# Patient Record
Sex: FEMALE | Race: WHITE | URBAN - METROPOLITAN AREA
[De-identification: names, ages, dates, MRNs, and addresses within clinical notes are randomized per-mention and may not be internally consistent; named-entity substitution may affect disease eponyms.]

---

## 2022-09-28 ENCOUNTER — OFFICE VISIT (OUTPATIENT)
Dept: NEUROLOGY | Facility: MEDICAL CENTER | Age: 62
End: 2022-09-28
Attending: PSYCHIATRY & NEUROLOGY
Payer: OTHER MISCELLANEOUS

## 2022-09-28 VITALS
SYSTOLIC BLOOD PRESSURE: 129 MMHG | HEART RATE: 77 BPM | OXYGEN SATURATION: 95 % | TEMPERATURE: 97.6 F | RESPIRATION RATE: 16 BRPM | DIASTOLIC BLOOD PRESSURE: 82 MMHG

## 2022-09-28 DIAGNOSIS — R20.2 NUMBNESS AND TINGLING OF BOTH FEET: Primary | ICD-10-CM

## 2022-09-28 DIAGNOSIS — R20.0 NUMBNESS AND TINGLING OF BOTH FEET: Primary | ICD-10-CM

## 2022-09-28 PROBLEM — G57.52 TARSAL TUNNEL SYNDROME OF LEFT SIDE: Status: ACTIVE | Noted: 2019-07-12

## 2022-09-28 PROCEDURE — 99202 OFFICE O/P NEW SF 15 MIN: CPT | Performed by: PSYCHIATRY & NEUROLOGY

## 2022-09-28 PROCEDURE — 99205 OFFICE O/P NEW HI 60 MIN: CPT | Performed by: PSYCHIATRY & NEUROLOGY

## 2022-09-28 RX ORDER — OMEPRAZOLE 20 MG/1
20 TABLET, DELAYED RELEASE ORAL DAILY
COMMUNITY

## 2022-09-28 RX ORDER — GABAPENTIN 300 MG/1
CAPSULE ORAL
Qty: 120 CAPSULE | Refills: 1 | Status: SHIPPED | OUTPATIENT
Start: 2022-09-28

## 2022-09-28 RX ORDER — THYROID,PORK 15 MG
15 TABLET ORAL EVERY MORNING
COMMUNITY
Start: 2022-09-14

## 2022-09-28 RX ORDER — THYROID, PORCINE 120 MG/1
120 TABLET ORAL EVERY MORNING
COMMUNITY
Start: 2022-09-15

## 2022-09-28 ASSESSMENT — ENCOUNTER SYMPTOMS
TINGLING: 1
FOCAL WEAKNESS: 0
NECK PAIN: 0
SENSORY CHANGE: 1
FALLS: 0
MEMORY LOSS: 0
BACK PAIN: 0

## 2022-09-28 ASSESSMENT — PATIENT HEALTH QUESTIONNAIRE - PHQ9: CLINICAL INTERPRETATION OF PHQ2 SCORE: 0

## 2022-09-29 NOTE — PROGRESS NOTES
"Melissa Sellers Galt is a 62 y.o. female who presents with her , from the office of Eusebio Orr MD, for consultation, with a history of bilateral paresthesias in the feet suggestive of possible polyneuropathy, status post EMG/NCV studies from 3 years ago.     PRITESH Spencer is a pleasant 62-year-old right-handed woman who symptoms in her feet started in about 2018, when she noted over the left lower extremity and foot numbness and tingling that involved the sole and heel.  This was most noticeable when she was wearing tighter shoes and hard soles.  Weightbearing made things worse.  Simply sitting down and loosening her shoes would always help.    She describes a \"pain\" like sensation and she now actually has some hyperalgesia in the left foot.  This is noticeable when she goes to sleep and at times feels better with her foot out from underneath the sheet.  She now has to wear soft soled shoes only.  At times she has to walk on her toes to avoid pressing down on the heel.  There is no actual weakness.  Balance was not more curtailed.  The right lower extremity and foot were not similarly involved when things started.  She has no history of back pain, there has been no change in bowel or bladder control at that time.    In July, 2019, she did undergo EMG/NCV studies of the lower extremities, I had a chance to review the report, revealing slowed distal latencies involving both medial and lateral tarsal nerves consistent with tarsal tunnel syndrome.  There was an incidental decreased amplitude of the sensory conduction in the right sural nerve of unclear significance.  There was no indication of a peripheral polyneuropathy or radiculopathy.    Over the last 6 months the right foot has begun to show signs of similar nature and distribution.  These fluctuate, but it is still along the heel and sole of the foot only.  It too worsens with weightbearing.  The left foot symptoms have continued on and " "there typical pattern.    She denies any history of weakness on either side, perianal or perigenital loss of sensation, constrictive sensations around the abdominal wall, and though there has been some recent change in urinary urgency and frequency, she is not incontinent.  There have been no directed treatments.    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  There have been no specific treatments offered otherwise.    She has a history of GERD and thyroid disease, no history of diabetes, malignancy, MS, autoimmune disease, hypertension, CVA, PVD, CAD, neurodegenerative disease, pulmonary disease, or blood dyscrasia.  There is no surgical history of note from my standpoint.    No one in the family has a history of similar symptoms or holds a diagnosis of neuropathy.    There is no history of tobacco or alcohol use of note.  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  She has no history of regular exposure to organic solvents, heavy metals, etc.  She is on [place] Thyroid and Prilosec OTC.    Review of Systems   Cardiovascular:  Negative for leg swelling.   Musculoskeletal:  Negative for back pain, falls and neck pain.   Neurological:  Positive for tingling and sensory change. Negative for focal weakness.   Psychiatric/Behavioral:  Negative for memory loss.    All other systems reviewed and are negative.    Objective     /82 (BP Location: Right arm, Patient Position: Sitting, BP Cuff Size: Adult)   Pulse 77   Temp 36.4 °C (97.6 °F) (Temporal)   Resp 16   Ht (P) 1.575 m (5' 2\")   Wt (P) 116 kg (255 lb 8.2 oz)   SpO2 95%   BMI (P) 46.73 kg/m²      Physical Exam    She appears in no acute distress.  Vital signs are stable.  She is overweight.  There is no malar rash or jaw claudication.  The neck is supple, range of motion is full, Lhermitte's " phenomena is absent, compression maneuvers are negative.  Cardiac evaluation is unremarkable.  Straight leg raising is negative bilaterally.  Distal pulses are intact in the feet.  The feet are warm to touch, capillary refill is normal bilaterally.     Neurological Exam    Fully oriented, there is no aphasia, agnosia, apraxia, or inattention.    PERRLA/EOMI, visual fields are full, facial movements are symmetric.  Sensory exam is intact to light touch, temperature and pinprick bilaterally.  The tongue and uvula are midline, there is no bulbar dysfunction.  Shoulder shrug and head rotation are normal.    Musculoskeletal exam reveals normal tone throughout.  Her body habitus prevents any real ability to look for atrophy.  There is no tremor or drift.  Strength is intact in all 4 extremities.    Reflexes are very brisk and present throughout, none are dropped and there are no asymmetries from right to left side.  Both toes are downgoing.    Interestingly, repetitive movements with the left foot are slowed both toe tapping as well as side to side movements.  There is no dystaxia with any of the extremities.  She has difficulty with heel walking because of pain.  Toe walking is normal.  Body habitus makes tandem walking difficult at best.    There seems to be a mild subjective perception of pinprick over the left medial foot below the ankle only.  Otherwise pinprick is intact as is JPS, temperature and vibration throughout.  Romberg is absent.    Assessment & Plan     1. Numbness and tingling of both feet  Whether or not this is simply bilateral tarsal tunnel syndrome that is evolved over time given the nature of her work remains to be seen, the examination is not necessarily consistent with a peripheral polyneuropathy, she certainly lacks a history of typical risk to develop the latter.  EMG/NCV studies are important in helping differentiate all of these possibilities.  I doubt that this is a multilevel  polyradiculopathy, though again this could be considered.  Before additional blood work and imaging are obtained, looking for possible causes, neurophysiologic study should be done first.  If this is a compression process of the tarsal tunnels bilaterally, one might consider decompressive surgery, as was suggested by her podiatrist, but for now I would recommend holding.    Symptomatically, gabapentin will be used to help her move around with less discomfort pending the work-up results.  Given that they travel hours from [place], [place], we will try to work follow-up the very same day the nerve studies are done.    Neurontin 300 mg nightly will be started, increasing every week by 300 mg increments up to 1200 mg nightly.  Side effects were reviewed.  They will contact the office to let us know how she is doing with the drug to decide if she should stay on because of benefit, or change to another due to lack thereof.    - Referral to Neurodiagnostics (EEG,EP,EMG/NCS/DBS)  - gabapentin (NEURONTIN) 300 MG Cap; 1 tab at bed for 1 week, then 2 tab at bed for 1 week, then 3 tab at bed for 1 week, then 4 tab qhs.  Dispense: 120 Capsule; Refill: 1    Time: 60 minutes in total spent on patient care including precharting, record review, discussion with healthcare staff and documentation.  This includes face-to-face time for exam, review, discussion, as well as counseling and coordinating care.

## 2022-10-19 ENCOUNTER — NON-PROVIDER VISIT (OUTPATIENT)
Dept: NEUROLOGY | Facility: MEDICAL CENTER | Age: 62
End: 2022-10-19
Attending: SPECIALIST
Payer: OTHER MISCELLANEOUS

## 2022-10-19 DIAGNOSIS — R20.2 NUMBNESS AND TINGLING OF BOTH FEET: ICD-10-CM

## 2022-10-19 DIAGNOSIS — R20.0 NUMBNESS AND TINGLING OF BOTH FEET: ICD-10-CM

## 2022-10-19 PROCEDURE — 95886 MUSC TEST DONE W/N TEST COMP: CPT | Performed by: PSYCHIATRY & NEUROLOGY

## 2022-10-19 PROCEDURE — 95911 NRV CNDJ TEST 9-10 STUDIES: CPT | Performed by: PSYCHIATRY & NEUROLOGY

## 2022-10-19 PROCEDURE — 95886 MUSC TEST DONE W/N TEST COMP: CPT | Mod: 26 | Performed by: PSYCHIATRY & NEUROLOGY

## 2022-10-19 PROCEDURE — 95911 NRV CNDJ TEST 9-10 STUDIES: CPT | Mod: 26 | Performed by: PSYCHIATRY & NEUROLOGY

## 2022-10-19 NOTE — PROCEDURES
"NERVE CONDUCTION STUDIES AND ELECTROMYOGRAPHY REPORT  [facility]  10/19/22          IMPRESSION:  This is an essentially normal study.  Low amplitude bilateral medial plantar mixed responses likely secondary to technical factors.  There is no robust electrophysiologic evidence of tibial neuropathy at the ankle (tarsal tunnel syndrome) or bilateral lumbosacral radiculopathy.  Clinical correlation recommended.      Opal Youngblood MD  Neurology - Neurophysiology              REASON FOR REFERRAL:  Ms. Johanna Sellers [place] 62 y.o. referred by Dr. Bayron Perkins for evaluation of numbness and tingling in the feet.  Symptoms have been present since 2018 and originally started as numbness and tingling involving the left sole and heel.  Since the spring 2022 she is began noticing similar symptoms in the right foot.  There is concern for tarsal tunnel syndrome.    Height: 5'2\"  Weight: 250 lbs    Focused neurological exam with evidence of decreased sensation to light touch on the bilateral heels sparing the medial and lateral aspect of the sole.      ELECTRODIAGNOSTIC EXAMINATION:  Nerve conduction studies (NCS) and electromyography (EMG) are utilized to evaluate direct or indirect damage to the peripheral nervous system. NCS are performed to measure the nerve(s) response(s) to electrostimulation across a given nerve segment. EMG evaluates the passive and active electrical activity of the muscle(s) in question.  Muscles are innervated by specific peripheral nerves and roots. Often times, several nerves the muscle to be examined in order to determine the presence or absence of the disease process. Furthermore, nerves and muscles may need to be tested in a [id number] comparison, as well as in additional extremities, as this may be crucial in characterizing the extent of the disease process, which may be diffuse or isolated and of varying degree of severity. The extent of the neurodiagnostic exam is " justified as it may help arrive to a proper diagnosis, which ultimately may contribute to better management of the patient. Therefore, the nerves to muscles examined during the study were medically necessary.    Unless otherwise noted, temperature of the extremity(s) study was monitored before and during the examination and remained between 32 and 36 degrees C for the upper extremities, and between 30 and 36 degrees C for the lower extremities. The patient tolerated testing well, without any complications.       NERVE CONDUCTION STUDY SUMMARY:  Selected nerves of the bilateral lower extremity are studied.    Normal bilateral sural sensory responses.  Normal bilateral common peroneal motor responses.  Normal bilateral tibial motor responses -low amplitude with proximal stimulation likely due to body habitus.  Normal bilateral lateral plantar mixed responses.  Low amplitude bilateral medial plantar mixed responses -suspect technical.      NEEDLE EMG SUMMARY:  Concentric needle study of selected bilateral lower extremity muscles is performed.     Insertion activity is normal in all muscles sampled.   With activation, there are normal morphology (amplitude/duration) motor unit action potentials firing with normal recruitment in muscles tested.       PATIENT DATA TABLES  Nerve Conduction Studies     Stim Site NR Onset (ms) Norm Onset (ms) O-P Amp (µV) Norm O-P Amp Site1 Site2 Delta-P (ms) Dist (cm) Felix (m/s) Norm Felix (m/s)   Left Sural Anti Sensory (Lat Mall)   Calf    2.7 <4.6 7.6 >3 Calf Lat Mall 3.6 14.0 *39 >40   Right Sural Anti Sensory (Lat Mall)   Calf    2.1 <4.6 9.4 >3 Calf Lat Mall 2.7 12.0 44 >40        Stim Site NR Onset (ms) Norm Onset (ms) O-P Amp (mV) Norm O-P Amp Site1 Site2 Delta-0 (ms) Dist (cm) Felix (m/s) Norm Felix (m/s)   Left Peroneal EDB Motor (Ext Dig Brev)   Ankle    3.4 <6 3.2 >2.5 B Fib Ankle 5.5 26.0 47 >40   B Fib    8.9  2.9  Poplt B Fib 1.7 10.0 59    Poplt    10.6  2.3          Right Peroneal  EDB Motor (Ext Dig Brev)   Ankle    3.8 <6 4.1 >2.5 B Fib Ankle 5.1 26.5 52 >40   B Fib    8.9  4.1  Poplt B Fib 1.7 10.0 59    Poplt    10.6  3.3          Left Tibial Motor (Abd Eubanks Brev)   Ankle    3.4 <6 18.8 >4 Knee Ankle 6.8 35.0 51 >40   Knee    10.2  5.2          Right Tibial Motor (Abd Eubanks Brev)   Ankle    2.6 <6 12.0 >4 Knee Ankle 7.0 36.0 51 >40   Knee    9.6  5.9               Stim Site NR Peak (ms) Norm Peak (ms) P-T Amp (µV) Norm P-T Amp Site1 Site2 Delta-P (ms) Dist (cm) Felix (m/s) Norm Felix (m/s)   Left Lateral Plantar Mixed (Med Malleolus)   Lateral Foot    2.3 <3.7 8.1 >8             2.4  6.9              2.5  7.9          Right Lateral Plantar Mixed (Med Malleolus)   Lateral Foot    2.2 <3.7 11.9 >8             2.5  4.8              2.4  5.4          Left Medial Plantar Mixed (Med Malleolus)   Medial Foot    2.5 <3.7 *2.6 >10             2.5  1.6          Site 3    2.7  1.6          Right Medial Plantar Mixed (Med Malleolus)   Medial Foot    2.5 <3.7 *2.9 >10             2.3  2.2              2.4  2.3                                      Electromyography     Side Muscle Nerve Root Ins Act Fibs Psw Amp Dur Poly Recrt Int Pat Comment   Right AntTibialis Dp Br Fibular L4-5 Nml Nml Nml Nml Nml 0 Nml Nml    Right Gastroc Tibial S1-2 Nml Nml Nml Nml Nml 0 Nml Nml    Right VastusLat Femoral L2-4 Nml Nml Nml Nml Nml 0 Nml Nml    Right GluteusMed SupGluteal L5-S1 Nml Nml Nml Nml Nml 0 Nml Nml    Left AntTibialis Dp Br Fibular L4-5 Nml Nml Nml Nml Nml 0 Nml Nml    Left Gastroc Tibial S1-2 Nml Nml Nml Nml Nml 0 Nml Nml    Left VastusLat Femoral L2-4 Nml Nml Nml Nml Nml 0 Nml Nml    Left GluteusMed SupGluteal L5-S1 Nml Nml Nml Nml Nml 0 Nml Nml    Right EHL Dp Br Fibular L5-S1 Nml Nml Nml Nml Nml 0 Nml Nml    Left  EHL Dp Br Fibular L5-S1 Nml Nml Nml Nml Nml 0 Nml Nml